# Patient Record
Sex: FEMALE | Race: WHITE | NOT HISPANIC OR LATINO | URBAN - METROPOLITAN AREA
[De-identification: names, ages, dates, MRNs, and addresses within clinical notes are randomized per-mention and may not be internally consistent; named-entity substitution may affect disease eponyms.]

---

## 2018-12-14 ENCOUNTER — EMERGENCY (EMERGENCY)
Facility: HOSPITAL | Age: 38
LOS: 1 days | Discharge: HOME | End: 2018-12-14
Attending: EMERGENCY MEDICINE
Payer: COMMERCIAL

## 2018-12-14 VITALS
TEMPERATURE: 97 F | SYSTOLIC BLOOD PRESSURE: 129 MMHG | OXYGEN SATURATION: 100 % | HEART RATE: 80 BPM | HEIGHT: 66 IN | DIASTOLIC BLOOD PRESSURE: 88 MMHG | WEIGHT: 164.91 LBS | RESPIRATION RATE: 17 BRPM

## 2018-12-14 LAB
ALBUMIN SERPL ELPH-MCNC: 3.9 G/DL — SIGNIFICANT CHANGE UP (ref 3.3–5.2)
ALP SERPL-CCNC: 57 U/L — SIGNIFICANT CHANGE UP (ref 40–120)
ALT FLD-CCNC: 15 U/L — SIGNIFICANT CHANGE UP
ANION GAP SERPL CALC-SCNC: 11 MMOL/L — SIGNIFICANT CHANGE UP (ref 5–17)
APTT BLD: 33.4 SEC — SIGNIFICANT CHANGE UP (ref 27.5–36.3)
AST SERPL-CCNC: 18 U/L — SIGNIFICANT CHANGE UP
BASOPHILS # BLD AUTO: 0 K/UL — SIGNIFICANT CHANGE UP (ref 0–0.2)
BASOPHILS NFR BLD AUTO: 0.4 % — SIGNIFICANT CHANGE UP (ref 0–2)
BILIRUB SERPL-MCNC: <0.2 MG/DL — LOW (ref 0.4–2)
BUN SERPL-MCNC: 12 MG/DL — SIGNIFICANT CHANGE UP (ref 8–20)
CALCIUM SERPL-MCNC: 8 MG/DL — LOW (ref 8.6–10.2)
CHLORIDE SERPL-SCNC: 108 MMOL/L — HIGH (ref 98–107)
CO2 SERPL-SCNC: 24 MMOL/L — SIGNIFICANT CHANGE UP (ref 22–29)
CREAT SERPL-MCNC: 0.48 MG/DL — LOW (ref 0.5–1.3)
EOSINOPHIL # BLD AUTO: 0.2 K/UL — SIGNIFICANT CHANGE UP (ref 0–0.5)
EOSINOPHIL NFR BLD AUTO: 3 % — SIGNIFICANT CHANGE UP (ref 0–6)
GLUCOSE SERPL-MCNC: 89 MG/DL — SIGNIFICANT CHANGE UP (ref 70–115)
HCG SERPL-ACNC: <4 MIU/ML — SIGNIFICANT CHANGE UP
HCT VFR BLD CALC: 39.5 % — SIGNIFICANT CHANGE UP (ref 37–47)
HGB BLD-MCNC: 12.9 G/DL — SIGNIFICANT CHANGE UP (ref 12–16)
INR BLD: 1.04 RATIO — SIGNIFICANT CHANGE UP (ref 0.88–1.16)
LYMPHOCYTES # BLD AUTO: 1.5 K/UL — SIGNIFICANT CHANGE UP (ref 1–4.8)
LYMPHOCYTES # BLD AUTO: 21.6 % — SIGNIFICANT CHANGE UP (ref 20–55)
MAGNESIUM SERPL-MCNC: 2.2 MG/DL — SIGNIFICANT CHANGE UP (ref 1.6–2.6)
MCHC RBC-ENTMCNC: 28.7 PG — SIGNIFICANT CHANGE UP (ref 27–31)
MCHC RBC-ENTMCNC: 32.7 G/DL — SIGNIFICANT CHANGE UP (ref 32–36)
MCV RBC AUTO: 88 FL — SIGNIFICANT CHANGE UP (ref 81–99)
MONOCYTES # BLD AUTO: 0.2 K/UL — SIGNIFICANT CHANGE UP (ref 0–0.8)
MONOCYTES NFR BLD AUTO: 2.7 % — LOW (ref 3–10)
NEUTROPHILS # BLD AUTO: 4.9 K/UL — SIGNIFICANT CHANGE UP (ref 1.8–8)
NEUTROPHILS NFR BLD AUTO: 72.2 % — SIGNIFICANT CHANGE UP (ref 37–73)
PLATELET # BLD AUTO: 322 K/UL — SIGNIFICANT CHANGE UP (ref 150–400)
POTASSIUM SERPL-MCNC: 4.4 MMOL/L — SIGNIFICANT CHANGE UP (ref 3.5–5.3)
POTASSIUM SERPL-SCNC: 4.4 MMOL/L — SIGNIFICANT CHANGE UP (ref 3.5–5.3)
PROT SERPL-MCNC: 6.6 G/DL — SIGNIFICANT CHANGE UP (ref 6.6–8.7)
PROTHROM AB SERPL-ACNC: 12 SEC — SIGNIFICANT CHANGE UP (ref 10–12.9)
RBC # BLD: 4.49 M/UL — SIGNIFICANT CHANGE UP (ref 4.4–5.2)
RBC # FLD: 12.4 % — SIGNIFICANT CHANGE UP (ref 11–15.6)
SODIUM SERPL-SCNC: 143 MMOL/L — SIGNIFICANT CHANGE UP (ref 135–145)
WBC # BLD: 6.8 K/UL — SIGNIFICANT CHANGE UP (ref 4.8–10.8)
WBC # FLD AUTO: 6.8 K/UL — SIGNIFICANT CHANGE UP (ref 4.8–10.8)

## 2018-12-14 PROCEDURE — 71046 X-RAY EXAM CHEST 2 VIEWS: CPT | Mod: 26

## 2018-12-14 PROCEDURE — 99284 EMERGENCY DEPT VISIT MOD MDM: CPT

## 2018-12-14 RX ORDER — SODIUM CHLORIDE 9 MG/ML
1000 INJECTION INTRAMUSCULAR; INTRAVENOUS; SUBCUTANEOUS ONCE
Qty: 0 | Refills: 0 | Status: COMPLETED | OUTPATIENT
Start: 2018-12-14 | End: 2018-12-14

## 2018-12-14 NOTE — ED PROVIDER NOTE - CARE PLAN
Principal Discharge DX:	Alcoholic intoxication without complication  Secondary Diagnosis:	Dehydration

## 2018-12-14 NOTE — ED ADULT TRIAGE NOTE - CHIEF COMPLAINT QUOTE
Pt awake and alert found rolling around in ground  behind post office, + AOB. Pt has speech  impediment, when asked if she drank today she verbalizes yes, doesn't not remember what she drank, denies drug use . Pt cooperative with staff, placed in yellow gown.  Bg in triage 83 Pt awake and alert found rolling around on ground  behind post office, + AOB. Pt has speech  impediment, when asked if she drank today she verbalizes yes, doesn't not remember what she drank, denies drug use . Pt cooperative with staff, placed in yellow gown.  Bg in triage 83

## 2018-12-14 NOTE — ED ADULT NURSE NOTE - OBJECTIVE STATEMENT
Patient A&OX3, Denies any pain. AS per EMS pt was found rolling around on ground  behind post office. Pt has speech impediment. Pt states that she drink alcohol today. Denies drug use. yellow gown in place for safety.

## 2018-12-14 NOTE — ED PROVIDER NOTE - OBJECTIVE STATEMENT
38 year old female with a h/o alcohol abuse presents with c/o intoxication. As per patients boyfriend she has been trying to detox on he own and does not drink everyday. Pt was found as per EMS rolling around on the ground behind the post- office. Pt states that she went to an office party and drank too much

## 2018-12-14 NOTE — ED PROVIDER NOTE - PROGRESS NOTE DETAILS
pt refuses detox and will be discharged in the custody of her boyfriend Pt awake and alert at this time and ambulatory with steady gait.  Pt's boyfriend in ED and jose gto take pt  home at this time

## 2018-12-14 NOTE — ED ADULT NURSE NOTE - CHIEF COMPLAINT QUOTE
Pt awake and alert found rolling around on ground  behind post office, + AOB. Pt has speech  impediment, when asked if she drank today she verbalizes yes, doesn't not remember what she drank, denies drug use . Pt cooperative with staff, placed in yellow gown.  Bg in triage 83

## 2018-12-14 NOTE — ED ADULT NURSE NOTE - INTERVENTIONS DEFINITIONS
Stretcher in lowest position, wheels locked, appropriate side rails in place/Provide visual cue, wrist band, yellow gown, etc./[place] to call system/Physically safe environment: no spills, clutter or unnecessary equipment/Provide visual clues: red socks

## 2018-12-15 VITALS
RESPIRATION RATE: 18 BRPM | OXYGEN SATURATION: 98 % | HEART RATE: 66 BPM | DIASTOLIC BLOOD PRESSURE: 65 MMHG | SYSTOLIC BLOOD PRESSURE: 135 MMHG

## 2018-12-15 PROCEDURE — 85730 THROMBOPLASTIN TIME PARTIAL: CPT

## 2018-12-15 PROCEDURE — 80053 COMPREHEN METABOLIC PANEL: CPT

## 2018-12-15 PROCEDURE — 82962 GLUCOSE BLOOD TEST: CPT

## 2018-12-15 PROCEDURE — 93010 ELECTROCARDIOGRAM REPORT: CPT

## 2018-12-15 PROCEDURE — 99285 EMERGENCY DEPT VISIT HI MDM: CPT | Mod: 25

## 2018-12-15 PROCEDURE — 80307 DRUG TEST PRSMV CHEM ANLYZR: CPT

## 2018-12-15 PROCEDURE — 83735 ASSAY OF MAGNESIUM: CPT

## 2018-12-15 PROCEDURE — 85027 COMPLETE CBC AUTOMATED: CPT

## 2018-12-15 PROCEDURE — 36415 COLL VENOUS BLD VENIPUNCTURE: CPT

## 2018-12-15 PROCEDURE — 93005 ELECTROCARDIOGRAM TRACING: CPT

## 2018-12-15 PROCEDURE — 71046 X-RAY EXAM CHEST 2 VIEWS: CPT

## 2018-12-15 PROCEDURE — 84702 CHORIONIC GONADOTROPIN TEST: CPT

## 2018-12-15 PROCEDURE — 85610 PROTHROMBIN TIME: CPT

## 2018-12-15 RX ADMIN — SODIUM CHLORIDE 1000 MILLILITER(S): 9 INJECTION INTRAMUSCULAR; INTRAVENOUS; SUBCUTANEOUS at 03:29
